# Patient Record
Sex: MALE | Race: WHITE | NOT HISPANIC OR LATINO | Employment: OTHER | URBAN - NONMETROPOLITAN AREA
[De-identification: names, ages, dates, MRNs, and addresses within clinical notes are randomized per-mention and may not be internally consistent; named-entity substitution may affect disease eponyms.]

---

## 2021-07-28 ENCOUNTER — OFFICE VISIT (OUTPATIENT)
Dept: FAMILY MEDICINE | Facility: OTHER | Age: 71
End: 2021-07-28
Attending: NURSE PRACTITIONER
Payer: MEDICARE

## 2021-07-28 VITALS
HEART RATE: 83 BPM | RESPIRATION RATE: 18 BRPM | SYSTOLIC BLOOD PRESSURE: 130 MMHG | DIASTOLIC BLOOD PRESSURE: 64 MMHG | TEMPERATURE: 97.8 F | OXYGEN SATURATION: 97 % | WEIGHT: 232.6 LBS

## 2021-07-28 DIAGNOSIS — R20.2 TINGLING OF RIGHT UPPER EXTREMITY: Primary | ICD-10-CM

## 2021-07-28 PROCEDURE — 99203 OFFICE O/P NEW LOW 30 MIN: CPT | Performed by: NURSE PRACTITIONER

## 2021-07-28 PROCEDURE — G0463 HOSPITAL OUTPT CLINIC VISIT: HCPCS

## 2021-07-28 RX ORDER — LISINOPRIL 10 MG/1
1 TABLET ORAL DAILY
COMMUNITY
Start: 2020-12-02

## 2021-07-28 RX ORDER — ROSUVASTATIN CALCIUM 10 MG/1
10 TABLET, COATED ORAL DAILY
COMMUNITY
Start: 2020-12-02

## 2021-07-28 ASSESSMENT — PAIN SCALES - GENERAL: PAINLEVEL: NO PAIN (0)

## 2021-07-28 NOTE — PROGRESS NOTES
ASSESSMENT/PLAN:  1. Tingling of right upper extremity    Discussed with the patient that based on his symptoms, physical exam findings and symptoms resolving they were most likely related to a pinched nerve vs. Musculoskeletal cause.     Instructed the patient to follow up if he develops these symptoms again or if he begins to develop concerning neurological symptoms.       May use over-the-counter Tylenol or ibuprofen PRN    Discussed warning signs/symptoms indicative of need to f/u    Follow up if symptoms persist or worsen or concerns      I explained my diagnostic considerations and recommendations to the patient, who voiced understanding and agreement with the treatment plan. All questions were answered. We discussed potential side effects of any prescribed or recommended therapies, as well as expectations for response to treatments.        HPI:    Ji Fox is a 70 year old male  who presents to [facility] today for tingling of his right arm. Started this morning. He states that the tingling has pretty much resolved during today's visit. He states that he was experiencing tingling from his elbow down to his fingers. The patient was doing a lot of driving yesterday. Denies numbness or pain of the right arm. Denies loss of  from his right hand. Denies vision changes.     History reviewed. No pertinent past medical history.  History reviewed. No pertinent surgical history.  Social History     Tobacco Use     Smoking status: Never Smoker     Smokeless tobacco: Never Used   Substance Use Topics     Alcohol use: Yes     Current Outpatient Medications   Medication Sig Dispense Refill     Aspirin Buf,CaCarb-MgCarb-MgO, 81 MG TABS Take 81 mg by mouth daily       lisinopril (ZESTRIL) 10 MG tablet Take 1 tablet by mouth daily       rosuvastatin (CRESTOR) 10 MG tablet Take 10 mg by mouth daily       Allergies   Allergen Reactions     Neomycin Rash         Past medical history, past surgical history, current  medications and allergies reviewed and accurate to the best of my knowledge.        ROS:  Refer to HPI    /64   Pulse 83   Temp 97.8  F (36.6  C) (Tympanic)   Resp 18   Wt 105.5 kg (232 lb 9.6 oz)   SpO2 97%     EXAM:  General Appearance: Well appearing male, appropriate appearance for age. No acute distress  Neurological: CN II-XII grossly intact. Romberg exam negative.    Musculoskeletal:  Equal movement of bilateral upper extremities.  Equal movement of bilateral lower extremities.  Normal gait.    Dermatological: No erythema, ecchymosis or swelling noted on exam.   Psychological: normal affect, alert, oriented, and pleasant.

## 2021-07-28 NOTE — NURSING NOTE
Chief Complaint   Patient presents with     Musculoskeletal Problem     right arm, tingling-no numbness     Patient is here for tingling in his right arm that started this morning.     Initial /64   Pulse 83   Temp 97.8  F (36.6  C) (Tympanic)   Resp 18   Wt 105.5 kg (232 lb 9.6 oz)   SpO2 97%  There is no height or weight on file to calculate BMI.  Medication Reconciliation: complete    Vanessa Stewart LPN